# Patient Record
Sex: FEMALE | Race: WHITE
[De-identification: names, ages, dates, MRNs, and addresses within clinical notes are randomized per-mention and may not be internally consistent; named-entity substitution may affect disease eponyms.]

---

## 2022-05-03 ENCOUNTER — HOSPITAL ENCOUNTER (OUTPATIENT)
Dept: HOSPITAL 97 - OR | Age: 67
Discharge: HOME | End: 2022-05-03
Attending: OBSTETRICS & GYNECOLOGY
Payer: COMMERCIAL

## 2022-05-03 VITALS — TEMPERATURE: 97.2 F | OXYGEN SATURATION: 97 % | SYSTOLIC BLOOD PRESSURE: 105 MMHG | DIASTOLIC BLOOD PRESSURE: 50 MMHG

## 2022-05-03 DIAGNOSIS — N95.0: Primary | ICD-10-CM

## 2022-05-03 DIAGNOSIS — N81.2: ICD-10-CM

## 2022-05-03 DIAGNOSIS — N95.2: ICD-10-CM

## 2022-05-03 DIAGNOSIS — Z20.822: ICD-10-CM

## 2022-05-03 DIAGNOSIS — N32.81: ICD-10-CM

## 2022-05-03 PROCEDURE — 0UDB7ZX EXTRACTION OF ENDOMETRIUM, VIA NATURAL OR ARTIFICIAL OPENING, DIAGNOSTIC: ICD-10-PCS

## 2022-05-03 PROCEDURE — 88305 TISSUE EXAM BY PATHOLOGIST: CPT

## 2022-05-03 PROCEDURE — 0UJD8ZZ INSPECTION OF UTERUS AND CERVIX, VIA NATURAL OR ARTIFICIAL OPENING ENDOSCOPIC: ICD-10-PCS

## 2022-05-03 PROCEDURE — 58558 HYSTEROSCOPY BIOPSY: CPT

## 2022-05-03 RX ADMIN — LIDOCAINE HYDROCHLORIDE,EPINEPHRINE BITARTRATE ONE ML: 10; .01 INJECTION, SOLUTION INFILTRATION; PERINEURAL at 14:26

## 2022-05-03 RX ADMIN — LIDOCAINE HYDROCHLORIDE,EPINEPHRINE BITARTRATE ONE ML: 10; .01 INJECTION, SOLUTION INFILTRATION; PERINEURAL at 14:51

## 2022-05-03 NOTE — P.BOP
Preoperative diagnosis: PMB, uterine prolapse stage 3


Postoperative diagnosis: same


Primary procedure: hysteroscopy d/c


Estimated blood loss: min


Specimen: EMC


Findings: CAVITY EMPTY ATROPHIC, 0/0/+2/4.5/mod/8/0/0/-2


Anesthesia: MAC


Complications: None


Transferred to: Recovery Room


Condition: Good

## 2022-05-10 NOTE — OP
Date of Procedure:  05/03/2022



Surgeon:  Allison Godwin MD



Assistant:  No assistant.



Preoperative Diagnoses:  Postmenopausal bleeding, stage III uterine prolapse.



Postoperative Diagnoses:  Postmenopausal bleeding, stage III uterine prolapse.



Procedures Performed:  Hysteroscopy and D and C.



Estimated Blood Loss:  Minimal.



Specimens:  Endometrial curettings.



Complications:  No complications.



Drains:  No drains.



Condition:  Stable.



Anesthesia:  MAC.



Findings:  Cavity was empty and atrophic.  Her POP-Q was 0, 0, +3, 4.5, moderate, 0, 0, -2.  Signific
ant apical descent, which was the most appreciated defect.



Indication:  The patient is a 67-year-old female, presented with postmenopausal bleeding and uterine 
prolapse.  After investigation, on transvaginal ultrasound, the endometrium appeared to be thickened,
 so endometrial sampling was discussed and she was brought to the OR.



Description Of Procedure:  After informed consent was verified, she was taken back to the OR, placed 
in supine fashion on the operating table and MAC was given.  She was placed in a dorsal lithotomy pos
ition.  Vulva and vagina were prepped and draped in a sterile fashion.  Then, speculum placed to expo
se the cervix.  Anterior lip grasped with an Allis clamp.  There was no need for speculum actually.  
Once the uterus was pulled out, cervix was dilated with cervical dilators up to 14-Martiniquais.  Diagnosti
c SlimLine hysteroscope was introduced into the cavity.  The cavity was empty.  No evidence of any ma
sses, cavity distortions.  Scope was removed.  Endometrial curettings were performed and all the inst
ruments were removed.  Instrument, needle, and sponge counts were correct.  EBL was minimal.  She was
 recovered from anesthesia and taken to PACU in stable condition. 



She will follow up with us in 1 week for results.





SK/KENDALL

DD:  05/10/2022 07:33:34Voice ID:  [id number]

DT:  05/10/2022 07:54:02Report ID:  [id number]

## 2022-09-07 LAB
BUN BLD-MCNC: 13 MG/DL (ref 7–18)
GLUCOSE SERPLBLD-MCNC: 104 MG/DL (ref 74–106)
HCT VFR BLD CALC: 39.9 % (ref 36–45)
INR BLD: 0.99
LYMPHOCYTES # SPEC AUTO: 2 K/UL (ref 0.7–4.9)
MCV RBC: 90.2 FL (ref 80–100)
PMV BLD: 6.8 FL (ref 7.6–11.3)
POTASSIUM SERPL-SCNC: 3.4 MMOL/L (ref 3.5–5.1)
RBC # BLD: 4.42 M/UL (ref 3.86–4.86)

## 2022-09-13 ENCOUNTER — HOSPITAL ENCOUNTER (OUTPATIENT)
Dept: HOSPITAL 97 - OR | Age: 67
Setting detail: OBSERVATION
LOS: 1 days | Discharge: HOME | End: 2022-09-14
Attending: OBSTETRICS & GYNECOLOGY | Admitting: OBSTETRICS & GYNECOLOGY
Payer: COMMERCIAL

## 2022-09-13 VITALS — OXYGEN SATURATION: 100 %

## 2022-09-13 VITALS — BODY MASS INDEX: 23.1 KG/M2

## 2022-09-13 DIAGNOSIS — N39.3: ICD-10-CM

## 2022-09-13 DIAGNOSIS — Z20.822: ICD-10-CM

## 2022-09-13 DIAGNOSIS — N32.81: ICD-10-CM

## 2022-09-13 DIAGNOSIS — N81.3: Primary | ICD-10-CM

## 2022-09-13 DIAGNOSIS — N95.2: ICD-10-CM

## 2022-09-13 DIAGNOSIS — N81.81: ICD-10-CM

## 2022-09-13 DIAGNOSIS — N88.8: ICD-10-CM

## 2022-09-13 PROCEDURE — 57283 COLPOPEXY INTRAPERITONEAL: CPT

## 2022-09-13 PROCEDURE — 85025 COMPLETE CBC W/AUTO DIFF WBC: CPT

## 2022-09-13 PROCEDURE — 0UT94ZZ RESECTION OF UTERUS, PERCUTANEOUS ENDOSCOPIC APPROACH: ICD-10-PCS

## 2022-09-13 PROCEDURE — 57230 REPAIR OF URETHRAL LESION: CPT

## 2022-09-13 PROCEDURE — 0UT24ZZ RESECTION OF BILATERAL OVARIES, PERCUTANEOUS ENDOSCOPIC APPROACH: ICD-10-PCS

## 2022-09-13 PROCEDURE — 0UT74ZZ RESECTION OF BILATERAL FALLOPIAN TUBES, PERCUTANEOUS ENDOSCOPIC APPROACH: ICD-10-PCS

## 2022-09-13 PROCEDURE — 0HQ9XZZ REPAIR PERINEUM SKIN, EXTERNAL APPROACH: ICD-10-PCS

## 2022-09-13 PROCEDURE — 80048 BASIC METABOLIC PNL TOTAL CA: CPT

## 2022-09-13 PROCEDURE — 36415 COLL VENOUS BLD VENIPUNCTURE: CPT

## 2022-09-13 PROCEDURE — 0JQC0ZZ REPAIR PELVIC REGION SUBCUTANEOUS TISSUE AND FASCIA, OPEN APPROACH: ICD-10-PCS

## 2022-09-13 PROCEDURE — 0USG7ZZ REPOSITION VAGINA, VIA NATURAL OR ARTIFICIAL OPENING: ICD-10-PCS

## 2022-09-13 PROCEDURE — 57260 CMBN ANT PST COLPRHY: CPT

## 2022-09-13 PROCEDURE — 88305 TISSUE EXAM BY PATHOLOGIST: CPT

## 2022-09-13 PROCEDURE — 57267 INSERT MESH/PELVIC FLR ADDON: CPT

## 2022-09-13 PROCEDURE — 0JUC0JZ SUPPLEMENT OF PELVIC REGION SUBCUTANEOUS TISSUE AND FASCIA WITH SYNTHETIC SUBSTITUTE, OPEN APPROACH: ICD-10-PCS

## 2022-09-13 PROCEDURE — 58571 TLH W/T/O 250 G OR LESS: CPT

## 2022-09-13 PROCEDURE — 86901 BLOOD TYPING SEROLOGIC RH(D): CPT

## 2022-09-13 PROCEDURE — 86850 RBC ANTIBODY SCREEN: CPT

## 2022-09-13 PROCEDURE — 87811 SARS-COV-2 COVID19 W/OPTIC: CPT

## 2022-09-13 PROCEDURE — 88307 TISSUE EXAM BY PATHOLOGIST: CPT

## 2022-09-13 PROCEDURE — 86900 BLOOD TYPING SEROLOGIC ABO: CPT

## 2022-09-13 PROCEDURE — 85610 PROTHROMBIN TIME: CPT

## 2022-09-13 PROCEDURE — 85730 THROMBOPLASTIN TIME PARTIAL: CPT

## 2022-09-13 PROCEDURE — 81003 URINALYSIS AUTO W/O SCOPE: CPT

## 2022-09-13 RX ADMIN — SODIUM CHLORIDE, SODIUM LACTATE, POTASSIUM CHLORIDE, AND CALCIUM CHLORIDE SCH MLS: .6; .31; .03; .02 INJECTION, SOLUTION INTRAVENOUS at 19:00

## 2022-09-14 VITALS — TEMPERATURE: 98.2 F | SYSTOLIC BLOOD PRESSURE: 115 MMHG | DIASTOLIC BLOOD PRESSURE: 60 MMHG

## 2022-09-14 LAB
BUN BLD-MCNC: 8 MG/DL (ref 7–18)
GLUCOSE SERPLBLD-MCNC: 111 MG/DL (ref 74–106)
HCT VFR BLD CALC: 32.5 % (ref 36–45)
LYMPHOCYTES # SPEC AUTO: 2 K/UL (ref 0.7–4.9)
MCV RBC: 89.9 FL (ref 80–100)
PMV BLD: 6.9 FL (ref 7.6–11.3)
POTASSIUM SERPL-SCNC: 3.5 MMOL/L (ref 3.5–5.1)
RBC # BLD: 3.62 M/UL (ref 3.86–4.86)

## 2022-09-14 RX ADMIN — SODIUM CHLORIDE, SODIUM LACTATE, POTASSIUM CHLORIDE, AND CALCIUM CHLORIDE SCH MLS: .6; .31; .03; .02 INJECTION, SOLUTION INTRAVENOUS at 00:38

## 2022-09-14 NOTE — OP
Date of Procedure:  09/13/2022



Surgeon:  Allison Godwin MD



Assistant:  Ana Paula Whelan.



Preoperative Diagnoses:  Stage III uterovaginal prolapse.  Level 1 uterine prolapse is the major blee
ding defect.  Also, had anterior and posterior wall defects and occult stress urinary incontinence.  
Also, had postmenopausal bleeding.



Postoperative Diagnoses:  Stage III uterovaginal prolapse.  Level 1 uterine prolapse is the major ble
eding defect.  Also, had anterior and posterior wall defects and occult stress urinary incontinence. 
 Also, had postmenopausal bleeding.  Perineal body defect was also found.  Urethrocele.



Procedures Performed:  

1.Total laparoscopic hysterectomy and bilateral salpingo-oophorectomy.

2.Laparoscopic sacral colpopexy.

3.Distal posterior wall and perineal body defect repairs (perineorrhaphy) with posterior defect repa
ir through the vagina.  Then, mid urethral sling (TVT-O), and cystoscopy.

4.Urethrocele repair.



Anesthesia:  General endotracheal.



Estimated Blood Loss:  100.



Urine Output:  Over 300.



Fluids:  LR, 1900.



Drains:  Ramos catheter.



Complications:  No complications.



Specimens:  Uterus, tubes, and ovaries.



Condition:  Stable.



Findings:  The POP-Q is 0, +2, +4, __________, -2, - 1, and -4, anterior and apical defects are the l
eading defects very consistent with her preoperative evaluation in the office. 



The ovaries and tubes were normal.  No intraabdominal pathology other than endometriosis and posterio
r vaginal wall adhesions to the peritoneum and obliteration of some of the space between the peritone
um and the posterior vaginal wall due to the endometrial implants. 



Y-mesh repair was performed without any problems.  Anterior fixation at 5 points.  Distal most latera
l fixations were with nonabsorbable Prolene.  Similar fashion sutures in the posterior wall also, the
 same pattern and it was a 5-point fixation.  Sacral fixation was done to the anterior longitudinal l
igament with the help of ProTac staples.  All the peritoneum was closed.  Vaginal cuff was closed wit
h 2-0 V-Loc. 



Mid urethral sling TVT-O, full-length was placed.  The cystoscopy was negative.  Both ureteric orific
es had good streams of urine.  No evidence of any foreign body, sling, or sutures.



Indications:  The patient is a 67-year-old who presented with postmenopausal bleeding and prolapse sy
mptoms, evaluated with ultrasound and endometrial sampling.  No atypia or malignancy.  We then discus
sed the options of surgery and pessary.  The patient wanted to proceed with surgical repair.  As the 
apical defect was the most leading defect the goal standard for fixation with minimal recurrence was 
discussed as Y-mesh repair with sacral colpopexy.  The patient understood the alternatives as vaginal
 repair without any grafts, laparoscopic repair without any grafts as well with 50% recurrence rate a
nd vaginal biologic graft repair after hysterectomy.  As alternatives are not with success rate milan
rable to the sacral colpopexy, however, is a good alternative as well.  After discussing all the bene
fits and risks, the patient wanted the procedure that had least risk of recurrence and as per standar
d of care due to her postmenopausal bleeding, hysterectomy and BSO were considered. 



She was consented and brought to the OR.  Questions and answers were done to our satisfaction.  Her h
usband was present by her side in the preoperative area.



Description Of Procedure:  She was then taken back to the OR and placed in supine fashion.  General a
nesthesia was given.  She was then placed in dorsal lithotomy position using Rodney stirrups.  Abdomen
, vulva, vagina, and perineum were prepped and draped in a sterile fashion.  Arms were tucked by the 
side.  SCDs were started.  Time-out was done and procedure was started.  A 2 g of Ancef were given pr
ior to preop.  Then, a large uterine manipulator was fixed in place.  Ramos was placed to drainage an
d for retrograde filling. 



The 1 cm supraumbilical incision was made with a scalpel using the open laparoscopy technique.  Fasci
a was incised, tagged with 0 Vicryl sutures.  Peritoneum entered sharply.  Hood introduced using S 
retractors.  After adequate insufflation, the patient was placed in Trendelenburg.  Upper abdominal s
urfaces were surveyed and were unremarkable.  Lower abdominal surfaces unremarkable as well other juli
n endometriosis in the posterior cul-de-sac and the peritoneum of the cul-de-sac attaching this to th
e posterior vaginal wall. 



The small bowel was tucked in the upper abdomen.  The epiploicae of the sigmoid colon were sutured wi
th 3-0 Monocryl and brought out through the Logan-Kaylee needle from the left upper quadrant and r
etraction was obtained.  The sacral promontory was visualized and was unremarkable. 



After all the areas were inspected and it was decided that this was __________ procedure, and the pro
cedure was started. 



Three 8 were placed in left and right lateral lower quadrant and suprapubic sites.  Then, the LigaSur
e was used to take down the IP ligament, mesosalpinx, round ligament, and broad ligament all the way 
to the vessels.  The anterior peritoneum was opened up to raise the bladder flap and this was taken d
own with help of the LigaSure.  On the opposite side, similar dissection was performed and once the v
essels were taken down, the peritoneum and the bladder were dissected inferiorly.  Then, the cardinal
 ligament remnants were felt and uterosacrals were taken down with the LigaSure.  Circumferential col
potomy was performed with a monopolar hook blade and the specimen detached and pulled out through the
 vagina. 



After a vaginal occluder was placed, the cuff was closed with the help of 2-0 V-Loc in a continuous r
unning fashion in 2 layers.  Once this was completely closed, thorough irrigation and suction was per
formed. 



The vaginal manipulator was placed to expose the anterior wall.  The bladder was dissected off the an
terior wall to the level of the trigone.  Then, dissection was wide enough protecting the area where 
the ureters would enter the bladder. 



There was one area that appeared to be into the detrusor muscle superficially.  So, the bladder was f
illed and drained to make sure there was no perforation. 



Once the bladder was found to be completely intact with no muscular defect even, I proceeded with the
 posterior wall dissection. 



It was difficult to dissect the posterior wall as there were endometriotic implants above the level o
f the rectum, but on the peritoneum covering the posterior vaginal wall in the cul-de-sac.  It was ve
ry difficult to separate the peritoneum from the posterior vaginal wall, so we went into the left par
arectal space, opened the peritoneum and dissected it medially.  After getting into the vaginal wall,
 dissection was performed all the way down to the level of the rectal reflection and the posterior wa
ll was opened up all the way to the right side as well using same plane safely. 



Once the posterior wall was well exposed, a small amount of peritoneum was left close to the cuff danielle
sure so that there was decreased erosion. 



Sacral promontory was picked up and incised with the help of scissors, then dissection of the periton
eum was done in a very clear fashion staying in between the right lateral border of the rectum, recto
sigmoid, and the ureter and going over the uterosacral to join the dissection to the cul-de-sac.  The
 track was expanded by just pulling laterally with blunt dissection.  Then, the sympathetic plexus wa
s identified and preserved by retracting the left and sacral promontory area was picked up and the lo
oser areolar tissue was carefully dissected and opened up to expose the anterior longitudinal ligamen
t.  Dr. Bliss was my assistant here for this portion of the procedure. 



We took down the Y-mesh, trimmed it to 9 cm in the posterior wall and for 6 cm in the anterior wall. 
 Then, the anterior arm was stitched onto the sacral arm with 2-0 Vicryl just for retraction and hold
ing while I stitched the posterior arm to the posterior vaginal wall. 



2-0 V-Loc was used to place a central stitch on the distal posterior wall graft and this was placed o
n the vagina through a good thickness of the vaginal wall so that this could lay flat.  A figure-of-e
ight was placed and then two 2-0 Prolene sutures were taken and placed on the vaginal wall, the dista
l posterior wall, laying the mesh flap on both sides with figure-of-eight through the tissue and thro
ugh the rectovaginal septum as well as the graft without any perforation into the vaginal canal.  Onc
e these 2 were tied down, two 2-0 V-Loc sutures were placed to hold the proximal part of the posterio
r graft on the posterior wall.  Once this was done, then attention was directed to the fixation at th
e sacrum retracting the sympathetic plexus to the left and then the iliac muscles to the right.  The 
mesh was laid and 2 ProTac pins were placed.  Tension was checked.  This appeared to be loose.  So, t
he mesh was slightly folded over by another centimeter and then 4 other ProTac pins were placed.  Aft
er this was adequately secured, the extra mesh was trimmed. 



There was excellent hemostasis with the sacral promontory.  No evidence of any trauma to any importan
t structures. 



We went on to the anterior wall.  This was a 5-point fixation.  Two distal lateral Prolene sutures 2-
0 were placed in a figure-of-eight fashion passing the suture through the graft and tying them down. 
 Then 2 lateral proximal anterior wall fixations were placed with 2-0 V-Loc in a figure-of-eight fash
ion to lay the mesh flat and then central distal suture also with 2-0 V-Loc in a figure-of-eight fash
ion to keep the mesh nice and flush with the vagina. 



There was a good portion of the Y that was left at the distance from the cuff closure to allow space 
for expansion of the vagina. 



The peritoneum was closed from the sacrum in a continuous running fashion all the way down to the 
tch of the Y and the right half.  Then, another V-Loc was used to close the rest from the left to the
 center.  __________, there was excellent support and no evidence of any electrical, mechanical, or t
hermal injury to the ureters or the vessels.  Pictures were taken.  The pedicles were all hemostatic.
 



All trocars were removed, injected with 0.25% Marcaine in the beginning and end of the case at the sk
in, fascia, and all port sites.  The bowel was released.  No evidence of any trauma here.  The gas wa
s desufflated.  The patient was placed in a supine fashion, taken out of Trendelenburg and after all 
ports were removed and injected, the umbilical port was removed and the fascia closed with tag suture
s tied to each other.  Simple 3-0 chromic sutures interrupted for closure of all skin incision. 



The patient was placed in Trendelenburg again.  The bladder was drained and clamped and retracted sup
eriorly.  Mid urethral area was picked up.  There was a distinct urethrocele.  So, incision was made 
after injecting dilute vasopressin in the midline.  About 1.5 cm incision was made.  Then, the underl
dania tissues were dissected and the urethra was carefully dissected away as well.  Then, the lateral 
borders of this dissection were trimmed to reduce the urethrocele in the fascial layer.  Then, the ip
silateral tracts were created to go to the obturator space under the inferior pubic ramus at 45-degre
e angle.  Once the track was created and the membrane perforated, thorough dissection was carried on 
the left, wing guide was placed.  Trocar was passed along the wing guide and once __________ passed t
he obturator membrane, this was turned hugging the inferior pubic ramus and exiting at a point 2 cm l
eft to the groin fold and at the level of the external meatus or slightly higher than that.  Then, pl
astic dilator was pulled out.  The mesh and the plastic sheaths were clamped with a Chaparrita and dilator
 cut.  Similar pass on opposite side and then central mesh was held with a small Allis clamp for prop
er tensioning and then there was a tension.  The plastic sheaths were pulled out.  The mesh kept flus
hed with the skin and the skin was glued with Dermabond. 



The center was visualized.  There was excellent tensioning without it being too loose or tight.  Afte
r antibiotic solution irrigation, then went ahead making sure the fascial edges came together and the
n closure of the urethra sealed with a 3-0 Vicryl in a continuous running fashion in a continuous loc
ked fashion without necrosing the flaps.  The flaps were cut flush so that there was no protrusion an
ymore. 



Ramos was removed.  Cystoscopy was performed and was negative.  No foreign body.  Ureters were functi
oning normally with strong jets of urine from both sides and no evidence of any trauma to the bladder
. 



Bladder was recatheterized and __________. 



Attention was directed to the posterior wall, hymenal ends were picked up with 2 Allis clamps.  A tri
angular skin incision was placed on the perineum.  After injecting a dilute vasopressin, with a 15 bl
gildardo, an excision of the superficial epithelium was done.  In the posterior wall another triangle was 
placed so that both bases touched each other and once this was done, the epithelium was removed with 
a knife and fascia of the epithelium was raised.  Fascial edges were found and they were sutured toge
ther distally in a side-to-side fashion with a continuous running 2-0 Vicryl suture.  Once this was r
eattached to the perineal body and cut, we then went ahead and closed the epithelium with a continuou
s running 2-0 Vicryl suture till the hymen and then at the hymen two 2-0 Vicryl interrupted sutures w
ere placed to bring this together and 0 Vicryl for closure of subcutaneous and subcuticular for the p
erineum.  Rectal exam was negative.  Instrument, needle, and sponge counts were correct at the end of
 the case.  The patient tolerated the procedure well.  She was recovered from anesthesia and taken to
 PACU in stable condition with the catheter in place.  She had a pack as well, which she will remove 
in the morning as well as the Ramos and voiding trial.  Her  was briefed and her friends about
 her surgery.





NEGRA

DD:  09/13/2022 22:12:19Voice ID:  [id number]

DT:  09/14/2022 08:11:30Report ID:  [id number]